# Patient Record
Sex: MALE | Race: ASIAN | NOT HISPANIC OR LATINO | URBAN - METROPOLITAN AREA
[De-identification: names, ages, dates, MRNs, and addresses within clinical notes are randomized per-mention and may not be internally consistent; named-entity substitution may affect disease eponyms.]

---

## 2023-09-30 ENCOUNTER — EMERGENCY (EMERGENCY)
Facility: HOSPITAL | Age: 38
LOS: 1 days | Discharge: LEFT BEFORE DISCHARGE | End: 2023-09-30
Attending: EMERGENCY MEDICINE
Payer: MEDICAID

## 2023-09-30 VITALS
TEMPERATURE: 99 F | OXYGEN SATURATION: 95 % | HEIGHT: 66 IN | DIASTOLIC BLOOD PRESSURE: 95 MMHG | HEART RATE: 112 BPM | SYSTOLIC BLOOD PRESSURE: 158 MMHG | RESPIRATION RATE: 16 BRPM

## 2023-09-30 PROCEDURE — L9991: CPT

## 2023-09-30 NOTE — ED ADULT NURSE NOTE - OBJECTIVE STATEMENT
Pt verbalize that " my heels are bothering me". Pt denies fall and denies trauma. Pt verbalize that the was drinking alochol today. Unable ot quantify amount.

## 2023-09-30 NOTE — ED ADULT TRIAGE NOTE - NS ED NURSE AMBULANCES
Morning  Hydrochlorothizide 25 mg  Amlodipine 10 mg  Aspirin 81 mg    Evening  Telmisartan 80 mg  Metoprolol 100 mg  Atorvastatin 80 mg       [facility]

## 2023-12-02 ENCOUNTER — EMERGENCY (EMERGENCY)
Facility: HOSPITAL | Age: 38
LOS: 1 days | Discharge: HOME | End: 2023-12-02
Attending: EMERGENCY MEDICINE | Admitting: EMERGENCY MEDICINE
Payer: MEDICAID

## 2023-12-02 VITALS
RESPIRATION RATE: 16 BRPM | OXYGEN SATURATION: 100 % | TEMPERATURE: 98 F | DIASTOLIC BLOOD PRESSURE: 71 MMHG | SYSTOLIC BLOOD PRESSURE: 109 MMHG | HEART RATE: 73 BPM

## 2023-12-02 LAB
ALBUMIN SERPL ELPH-MCNC: 4 G/DL — SIGNIFICANT CHANGE UP (ref 3.3–5)
ALBUMIN SERPL ELPH-MCNC: 4 G/DL — SIGNIFICANT CHANGE UP (ref 3.3–5)
ALP SERPL-CCNC: 82 U/L — SIGNIFICANT CHANGE UP (ref 40–120)
ALP SERPL-CCNC: 82 U/L — SIGNIFICANT CHANGE UP (ref 40–120)
ALT FLD-CCNC: 20 U/L — SIGNIFICANT CHANGE UP (ref 4–41)
ALT FLD-CCNC: 20 U/L — SIGNIFICANT CHANGE UP (ref 4–41)
ANION GAP SERPL CALC-SCNC: 14 MMOL/L — SIGNIFICANT CHANGE UP (ref 7–14)
ANION GAP SERPL CALC-SCNC: 14 MMOL/L — SIGNIFICANT CHANGE UP (ref 7–14)
AST SERPL-CCNC: 27 U/L — SIGNIFICANT CHANGE UP (ref 4–40)
AST SERPL-CCNC: 27 U/L — SIGNIFICANT CHANGE UP (ref 4–40)
BASOPHILS # BLD AUTO: 0.02 K/UL — SIGNIFICANT CHANGE UP (ref 0–0.2)
BASOPHILS # BLD AUTO: 0.02 K/UL — SIGNIFICANT CHANGE UP (ref 0–0.2)
BASOPHILS NFR BLD AUTO: 0.4 % — SIGNIFICANT CHANGE UP (ref 0–2)
BASOPHILS NFR BLD AUTO: 0.4 % — SIGNIFICANT CHANGE UP (ref 0–2)
BILIRUB DIRECT SERPL-MCNC: <0.2 MG/DL — SIGNIFICANT CHANGE UP (ref 0–0.3)
BILIRUB DIRECT SERPL-MCNC: <0.2 MG/DL — SIGNIFICANT CHANGE UP (ref 0–0.3)
BILIRUB INDIRECT FLD-MCNC: >0.1 MG/DL — SIGNIFICANT CHANGE UP (ref 0–1)
BILIRUB INDIRECT FLD-MCNC: >0.1 MG/DL — SIGNIFICANT CHANGE UP (ref 0–1)
BILIRUB SERPL-MCNC: 0.3 MG/DL — SIGNIFICANT CHANGE UP (ref 0.2–1.2)
BUN SERPL-MCNC: 13 MG/DL — SIGNIFICANT CHANGE UP (ref 7–23)
BUN SERPL-MCNC: 13 MG/DL — SIGNIFICANT CHANGE UP (ref 7–23)
CALCIUM SERPL-MCNC: 8.9 MG/DL — SIGNIFICANT CHANGE UP (ref 8.4–10.5)
CALCIUM SERPL-MCNC: 8.9 MG/DL — SIGNIFICANT CHANGE UP (ref 8.4–10.5)
CHLORIDE SERPL-SCNC: 108 MMOL/L — HIGH (ref 98–107)
CHLORIDE SERPL-SCNC: 108 MMOL/L — HIGH (ref 98–107)
CO2 SERPL-SCNC: 23 MMOL/L — SIGNIFICANT CHANGE UP (ref 22–31)
CO2 SERPL-SCNC: 23 MMOL/L — SIGNIFICANT CHANGE UP (ref 22–31)
CREAT SERPL-MCNC: 0.67 MG/DL — SIGNIFICANT CHANGE UP (ref 0.5–1.3)
CREAT SERPL-MCNC: 0.67 MG/DL — SIGNIFICANT CHANGE UP (ref 0.5–1.3)
EGFR: 122 ML/MIN/1.73M2 — SIGNIFICANT CHANGE UP
EGFR: 122 ML/MIN/1.73M2 — SIGNIFICANT CHANGE UP
EOSINOPHIL # BLD AUTO: 0.16 K/UL — SIGNIFICANT CHANGE UP (ref 0–0.5)
EOSINOPHIL # BLD AUTO: 0.16 K/UL — SIGNIFICANT CHANGE UP (ref 0–0.5)
EOSINOPHIL NFR BLD AUTO: 2.9 % — SIGNIFICANT CHANGE UP (ref 0–6)
EOSINOPHIL NFR BLD AUTO: 2.9 % — SIGNIFICANT CHANGE UP (ref 0–6)
GLUCOSE SERPL-MCNC: 79 MG/DL — SIGNIFICANT CHANGE UP (ref 70–99)
GLUCOSE SERPL-MCNC: 79 MG/DL — SIGNIFICANT CHANGE UP (ref 70–99)
HCT VFR BLD CALC: 36.4 % — LOW (ref 39–50)
HCT VFR BLD CALC: 36.4 % — LOW (ref 39–50)
HGB BLD-MCNC: 12 G/DL — LOW (ref 13–17)
HGB BLD-MCNC: 12 G/DL — LOW (ref 13–17)
IANC: 2.92 K/UL — SIGNIFICANT CHANGE UP (ref 1.8–7.4)
IANC: 2.92 K/UL — SIGNIFICANT CHANGE UP (ref 1.8–7.4)
IMM GRANULOCYTES NFR BLD AUTO: 0.2 % — SIGNIFICANT CHANGE UP (ref 0–0.9)
IMM GRANULOCYTES NFR BLD AUTO: 0.2 % — SIGNIFICANT CHANGE UP (ref 0–0.9)
LYMPHOCYTES # BLD AUTO: 2.03 K/UL — SIGNIFICANT CHANGE UP (ref 1–3.3)
LYMPHOCYTES # BLD AUTO: 2.03 K/UL — SIGNIFICANT CHANGE UP (ref 1–3.3)
LYMPHOCYTES # BLD AUTO: 37.3 % — SIGNIFICANT CHANGE UP (ref 13–44)
LYMPHOCYTES # BLD AUTO: 37.3 % — SIGNIFICANT CHANGE UP (ref 13–44)
MAGNESIUM SERPL-MCNC: 1.8 MG/DL — SIGNIFICANT CHANGE UP (ref 1.6–2.6)
MAGNESIUM SERPL-MCNC: 1.8 MG/DL — SIGNIFICANT CHANGE UP (ref 1.6–2.6)
MCHC RBC-ENTMCNC: 31 PG — SIGNIFICANT CHANGE UP (ref 27–34)
MCHC RBC-ENTMCNC: 31 PG — SIGNIFICANT CHANGE UP (ref 27–34)
MCHC RBC-ENTMCNC: 33 GM/DL — SIGNIFICANT CHANGE UP (ref 32–36)
MCHC RBC-ENTMCNC: 33 GM/DL — SIGNIFICANT CHANGE UP (ref 32–36)
MCV RBC AUTO: 94.1 FL — SIGNIFICANT CHANGE UP (ref 80–100)
MCV RBC AUTO: 94.1 FL — SIGNIFICANT CHANGE UP (ref 80–100)
MONOCYTES # BLD AUTO: 0.3 K/UL — SIGNIFICANT CHANGE UP (ref 0–0.9)
MONOCYTES # BLD AUTO: 0.3 K/UL — SIGNIFICANT CHANGE UP (ref 0–0.9)
MONOCYTES NFR BLD AUTO: 5.5 % — SIGNIFICANT CHANGE UP (ref 2–14)
MONOCYTES NFR BLD AUTO: 5.5 % — SIGNIFICANT CHANGE UP (ref 2–14)
NEUTROPHILS # BLD AUTO: 2.92 K/UL — SIGNIFICANT CHANGE UP (ref 1.8–7.4)
NEUTROPHILS # BLD AUTO: 2.92 K/UL — SIGNIFICANT CHANGE UP (ref 1.8–7.4)
NEUTROPHILS NFR BLD AUTO: 53.7 % — SIGNIFICANT CHANGE UP (ref 43–77)
NEUTROPHILS NFR BLD AUTO: 53.7 % — SIGNIFICANT CHANGE UP (ref 43–77)
NRBC # BLD: 0 /100 WBCS — SIGNIFICANT CHANGE UP (ref 0–0)
NRBC # BLD: 0 /100 WBCS — SIGNIFICANT CHANGE UP (ref 0–0)
NRBC # FLD: 0 K/UL — SIGNIFICANT CHANGE UP (ref 0–0)
NRBC # FLD: 0 K/UL — SIGNIFICANT CHANGE UP (ref 0–0)
PHOSPHATE SERPL-MCNC: 3.3 MG/DL — SIGNIFICANT CHANGE UP (ref 2.5–4.5)
PHOSPHATE SERPL-MCNC: 3.3 MG/DL — SIGNIFICANT CHANGE UP (ref 2.5–4.5)
PLATELET # BLD AUTO: 196 K/UL — SIGNIFICANT CHANGE UP (ref 150–400)
PLATELET # BLD AUTO: 196 K/UL — SIGNIFICANT CHANGE UP (ref 150–400)
POTASSIUM SERPL-MCNC: 4.1 MMOL/L — SIGNIFICANT CHANGE UP (ref 3.5–5.3)
POTASSIUM SERPL-MCNC: 4.1 MMOL/L — SIGNIFICANT CHANGE UP (ref 3.5–5.3)
POTASSIUM SERPL-SCNC: 4.1 MMOL/L — SIGNIFICANT CHANGE UP (ref 3.5–5.3)
POTASSIUM SERPL-SCNC: 4.1 MMOL/L — SIGNIFICANT CHANGE UP (ref 3.5–5.3)
PROT SERPL-MCNC: 7.1 G/DL — SIGNIFICANT CHANGE UP (ref 6–8.3)
PROT SERPL-MCNC: 7.1 G/DL — SIGNIFICANT CHANGE UP (ref 6–8.3)
RBC # BLD: 3.87 M/UL — LOW (ref 4.2–5.8)
RBC # BLD: 3.87 M/UL — LOW (ref 4.2–5.8)
RBC # FLD: 13.7 % — SIGNIFICANT CHANGE UP (ref 10.3–14.5)
RBC # FLD: 13.7 % — SIGNIFICANT CHANGE UP (ref 10.3–14.5)
SODIUM SERPL-SCNC: 145 MMOL/L — SIGNIFICANT CHANGE UP (ref 135–145)
SODIUM SERPL-SCNC: 145 MMOL/L — SIGNIFICANT CHANGE UP (ref 135–145)
WBC # BLD: 5.44 K/UL — SIGNIFICANT CHANGE UP (ref 3.8–10.5)
WBC # BLD: 5.44 K/UL — SIGNIFICANT CHANGE UP (ref 3.8–10.5)
WBC # FLD AUTO: 5.44 K/UL — SIGNIFICANT CHANGE UP (ref 3.8–10.5)
WBC # FLD AUTO: 5.44 K/UL — SIGNIFICANT CHANGE UP (ref 3.8–10.5)

## 2023-12-02 PROCEDURE — 99284 EMERGENCY DEPT VISIT MOD MDM: CPT

## 2023-12-02 NOTE — ED ADULT NURSE NOTE - NSFALLUNIVINTERV_ED_ALL_ED
Bed/Stretcher in lowest position, wheels locked, appropriate side rails in place/Call bell, personal items and telephone in reach/Instruct patient to call for assistance before getting out of bed/chair/stretcher/Non-slip footwear applied when patient is off stretcher/[place] to call system/Physically safe environment - no spills, clutter or unnecessary equipment/Purposeful proactive rounding/Room/bathroom lighting operational, light cord in reach Bed/Stretcher in lowest position, wheels locked, appropriate side rails in place/Call bell, personal items and telephone in reach/Instruct patient to call for assistance before getting out of bed/chair/stretcher/Non-slip footwear applied when patient is off stretcher/[place] to call system/Physically safe environment - no spills, clutter or unnecessary equipment/Purposeful proactive rounding/Room/bathroom lighting operational, light cord in reach Bed/Stretcher in lowest position, wheels locked, appropriate side rails in place/Call bell, personal items and telephone in reach/Instruct patient to call for assistance before getting out of bed/chair/stretcher/Non-slip footwear applied when patient is off stretcher/[place] to call system/Physically safe environment - no spills, clutter or unnecessary equipment/Purposeful proactive rounding/Room/bathroom lighting operational, light cord in reach

## 2023-12-02 NOTE — ED PROVIDER NOTE - PHYSICAL EXAMINATION
VITALS: reviewed  GEN: NAD, disheveled appearing, intoxicated appearing  HEAD/EYES: NCAT, PERRL, EOMI, anicteric sclerae, no conjunctival pallor  ENT: mucus membranes moist, oropharynx WNL, trachea midline, no JVD  RESP: lungs CTA with equal breath sounds bilaterally, chest wall nontender and atraumatic  CV: heart with reg rhythm S1, S2, no murmur; distal pulses intact and symmetric bilaterally  ABDOMEN: normoactive bowel sounds, soft, nondistended, nontender, no palpable masses  : no CVAT  MSK: extremities atraumatic and nontender, no edema, no asymmetry. the back is without midline or lateral tenderness, there is no spinal deformity or stepoff and the back is ranged painlessly. the neck has no midline tenderness, deformity, or stepoff, and is ranged painlessly.  SKIN: warm, dry, no rash, no bruising, no cyanosis. color appropriate for ethnicity  NEURO: alert, mentating appropriately, no facial asymmetry. gross sensation, motor, coordination are intact  PSYCH: intoxicated resting calmly at this time

## 2023-12-02 NOTE — ED ADULT NURSE NOTE - CHIEF COMPLAINT QUOTE
Pt Rocío speaking ID# [id number] pt c/o b/l knee pain x 4 months, endorses to daily drinking, reports he drinks 4-5 glasses of vodka per day with last drink about 45 minutes ago. Denies illicit drug use. blood glucose 83. Pt Rocío speaking ID# [id number] pt c/o b/l knee pain x 4 months, endorses to daily drinking, reports he drinks 4-5 glasses of vodka per day with last drink about 45 minutes ago. Denies illicit drug use. blood glucose 83. Pt Rocío speaking ID# [id number] pt c/o b/l knee pain x 4 months, endorses to daily drinking, reports he drinks 4-5 glasses of vodka per day with last drink about 45 minutes ago. Denies illicit drug use. blood glucose 83.

## 2023-12-02 NOTE — ED ADULT NURSE NOTE - NS ED NURSE RECORD ANOTHER VITAL SIGN
6/30/2023        Veronica Mosley  [address],   [place] [address]        Dear Veronica Mosley    Your test results from your last visit have returned.  Your LDL is better, but your triglyceride is elevated. Follow a low carb diet and talk to your primary care doctor about controlling your blood sugar. Please keep your previously scheduled follow up appointment to discuss in detail.  If you have any further questions, please feel free to call.     Resulted Orders   Lipid Panel With Reflex   Result Value Ref Range    Cholesterol 169 <=199 mg/dL    Triglycerides 357 (H) <=149 mg/dL    HDL 30 (L) >=50 mg/dL    LDL 68 <=129 mg/dL    Non-HDL Cholesterol 139 mg/dL    Cholesterol/ HDL Ratio 5.6 (H) <=4.4           Sincerely,        Lit Mcdonnell MD  [facility]-Cardiology Department  [address] [place] [id number]  Phone: [phone]    
Yes

## 2023-12-02 NOTE — ED PROVIDER NOTE - PROGRESS NOTE DETAILS
Nicolas Herrera, DO (PGY-1) Labs no severe electrolyte abnormalities. Patient clinically more sober, will DC. Not requesting any pain control for his leg pain as he currently is asymptomatic. Nicolas Herrera, DO (PGY-1) Patient reevaluated at bedside. Patient is doing well, clinically sober. Patient is undomiciled so will have social work give him resources. Nicolas Herrera, DO (PGY-1) Labs no severe electrolyte abnormalities. Will reassess in the morning to make sure patient clinically more sober, will DC. Not requesting any pain control for his leg pain as he currently is asymptomatic.

## 2023-12-02 NOTE — ED ADULT TRIAGE NOTE - CHIEF COMPLAINT QUOTE
Pt Rocío speaking ID# [id number] pt c/o b/l knee pain x 4 months, endorses to daily drinking, reports he drinks 4-5 glasses of vodka per day with last drink about 45 minutes ago. Denies illicit drug use. blood glucose 83.

## 2023-12-02 NOTE — ED PROVIDER NOTE - CLINICAL SUMMARY MEDICAL DECISION MAKING FREE TEXT BOX
39 y/o M Rocío speaking PMH ETOH abuse presents c/o b/l leg pain which he states feels like a burning sensation from the ankles to the knees. Denies any recent trauma  VSS  On exam patient is mildly intoxicated but oriented to person place and time, easily arousable. Extremities no edema non-tender some excoriations no rashes or deformities. Some areas of ecchymosis in late stages of healing.   DDx includes but not limited to neuropathy iso of chronic alcohol use. Less likely dvt iso no calf tendrness. 37 y/o M Rocío speaking PMH ETOH abuse presents c/o b/l leg pain which he states feels like a burning sensation from the ankles to the knees. Denies any recent trauma  VSS  On exam patient is mildly intoxicated but oriented to person place and time, easily arousable. Extremities no edema non-tender some excoriations no rashes or deformities. Some areas of ecchymosis in late stages of healing.   DDx includes but not limited to neuropathy iso of chronic alcohol use. Less likely dvt iso no calf tendrness. 39 y/o M Rocío speaking PMH ETOH abuse presents c/o b/l leg pain which he states feels like a burning sensation from the ankles to the knees. Denies any recent trauma  VSS  On exam patient is mildly intoxicated but oriented to person place and time, easily arousable. Extremities no edema non-tender some excoriations no rashes or deformities. Some areas of ecchymosis in late stages of healing.   DDx includes but not limited to neuropathy iso of chronic alcohol use. Less likely dvt iso no calf tendrness. [facility], labs, DC 37 y/o M Rocío speaking PMH ETOH abuse presents c/o b/l leg pain which he states feels like a burning sensation from the ankles to the knees. Denies any recent trauma  VSS  On exam patient is mildly intoxicated but oriented to person place and time, easily arousable. Extremities no edema non-tender some excoriations no rashes or deformities. Some areas of ecchymosis in late stages of healing.   DDx includes but not limited to neuropathy iso of chronic alcohol use. Less likely dvt iso no calf tendrness. [facility], labs, DC 39 y/o M Rocío speaking PMH ETOH abuse presents c/o b/l leg pain which he states feels like a burning sensation from the ankles to the knees. Denies any recent trauma  VSS  On exam patient is mildly intoxicated but oriented to person place and time, easily arousable. Extremities no edema non-tender some excoriations no rashes or deformities. Some areas of ecchymosis in late stages of healing.   DDx includes but not limited to neuropathy iso of chronic alcohol use. Less likely dvt iso no calf tendrness. [facility], labs, DC

## 2023-12-02 NOTE — ED PROVIDER NOTE - OBJECTIVE STATEMENT
Patient is a 39 y/o M Rocío speaking (ID# [id number] Dony)  PMHx ETOH abuse ( states he drinks 4-5 glasses of vodka per day with last drink about 45 minutes prior to arrival, states he has never been admitted for alcohol withdrawal)  presents for bilateral leg pain,  from his ankles to his knees for the past 4 months. Patient states he drinks alcohol when it is cold out and he is homeless so he can't go inside. His leg pain feels like a burning sensation. Denies any trauma to his legs. No fevers or rashes. Denies any anxiety, palpitations, states he does not feel like he is in withdrawal.   Patient denies any fevers, chills, nausea, vomiting, chest pain, diarrhea, constipation, painful urination, new rashes.   Denies illicit drug use. Patient is a 37 y/o M Rocío speaking (ID# [id number] Dony)  PMHx ETOH abuse ( states he drinks 4-5 glasses of vodka per day with last drink about 45 minutes prior to arrival, states he has never been admitted for alcohol withdrawal)  presents for bilateral leg pain,  from his ankles to his knees for the past 4 months. Patient states he drinks alcohol when it is cold out and he is homeless so he can't go inside. His leg pain feels like a burning sensation. Denies any trauma to his legs. No fevers or rashes. Denies any anxiety, palpitations, states he does not feel like he is in withdrawal.   Patient denies any fevers, chills, nausea, vomiting, chest pain, diarrhea, constipation, painful urination, new rashes.   Denies illicit drug use. Patient is a 39 y/o M Rocío speaking (ID# [id number] Dony)  PMHx ETOH abuse ( states he drinks 4-5 glasses of vodka per day with last drink about 45 minutes prior to arrival, states he has never been admitted for alcohol withdrawal)  presents for bilateral leg pain,  from his ankles to his knees for the past 4 months. Patient states he drinks alcohol when it is cold out and he is homeless so he can't go inside. His leg pain feels like a burning sensation. Denies any trauma to his legs. No fevers or rashes. Denies any anxiety, palpitations, states he does not feel like he is in withdrawal.   Patient denies any fevers, chills, nausea, vomiting, chest pain, diarrhea, constipation, painful urination, new rashes.   Denies illicit drug use.

## 2023-12-02 NOTE — ED PROVIDER NOTE - ATTENDING CONTRIBUTION TO CARE
Attending Statement: I have personally seen and examined this patient. I have fully participated in the care of this patient. I have reviewed all pertinent clinical information, including history physical exam, plan and the Resident's note and agree except as noted  "37 y/o M Rocío speaking (ID# [id number] Dony)  PMHx ETOH abuse ( states he drinks 4-5 glasses of vodka per day with last drink about 45 minutes prior to arrival, states he has never been admitted for alcohol withdrawal)"  agree w above:  Chief complaint of bilateral leg pain for months.  Denies any falls or trauma.  Able to walk.  No numbness no weakness.  Endorses daily alcohol use drank prior to arrival.  Has no other complaints.  ROS unremarkable.  vss  nontoxic male laying in  bed, no distress.  cooperative not tremulous. no WOB. soft nt abdomen. moving all ext no difficulty. no shortening or rotation of bl lower ext. no bl leg swelling. no lac. old healed scabs.   plan labs, ciwa monitor and reassess Attending Statement: I have personally seen and examined this patient. I have fully participated in the care of this patient. I have reviewed all pertinent clinical information, including history physical exam, plan and the Resident's note and agree except as noted  "39 y/o M Rocío speaking (ID# [id number] Dony)  PMHx ETOH abuse ( states he drinks 4-5 glasses of vodka per day with last drink about 45 minutes prior to arrival, states he has never been admitted for alcohol withdrawal)"  agree w above:  Chief complaint of bilateral leg pain for months.  Denies any falls or trauma.  Able to walk.  No numbness no weakness.  Endorses daily alcohol use drank prior to arrival.  Has no other complaints.  ROS unremarkable.  vss  nontoxic male laying in  bed, no distress.  cooperative not tremulous. no WOB. soft nt abdomen. moving all ext no difficulty. no shortening or rotation of bl lower ext. no bl leg swelling. no lac. old healed scabs.   plan labs, ciwa monitor and reassess Attending Statement: I have personally seen and examined this patient. I have fully participated in the care of this patient. I have reviewed all pertinent clinical information, including history physical exam, plan and the Resident's note and agree except as noted  "37 y/o M Rocío speaking (ID# [id number] Dony)  PMHx ETOH abuse ( states he drinks 4-5 glasses of vodka per day with last drink about 45 minutes prior to arrival, states he has never been admitted for alcohol withdrawal)"  agree w above:  Chief complaint of bilateral leg pain for months.  Denies any falls or trauma.  Able to walk.  No numbness no weakness.  Endorses daily alcohol use drank prior to arrival.  Has no other complaints.  ROS unremarkable.  vss  nontoxic male laying in  bed, no distress.  cooperative not tremulous. no WOB. soft nt abdomen. moving all ext no difficulty. no shortening or rotation of bl lower ext. no bl leg swelling. no lac. old healed scabs.   plan labs, ciwa monitor and reassess

## 2023-12-02 NOTE — ED ADULT NURSE NOTE - OBJECTIVE STATEMENT
patient presents to ed c/o b/l leg pain. denies falls or trauma. endorses ETOH. reports drinking "4 small of vodka". denies any cp, sob, n/v, fever/chills

## 2023-12-03 VITALS
HEART RATE: 70 BPM | DIASTOLIC BLOOD PRESSURE: 68 MMHG | RESPIRATION RATE: 16 BRPM | TEMPERATURE: 98 F | SYSTOLIC BLOOD PRESSURE: 119 MMHG | OXYGEN SATURATION: 98 %

## 2023-12-03 LAB
ANION GAP SERPL CALC-SCNC: 18 MMOL/L — HIGH (ref 7–14)
ANION GAP SERPL CALC-SCNC: 18 MMOL/L — HIGH (ref 7–14)
BUN SERPL-MCNC: 13 MG/DL — SIGNIFICANT CHANGE UP (ref 7–23)
BUN SERPL-MCNC: 13 MG/DL — SIGNIFICANT CHANGE UP (ref 7–23)
CALCIUM SERPL-MCNC: 8.7 MG/DL — SIGNIFICANT CHANGE UP (ref 8.4–10.5)
CALCIUM SERPL-MCNC: 8.7 MG/DL — SIGNIFICANT CHANGE UP (ref 8.4–10.5)
CHLORIDE SERPL-SCNC: 109 MMOL/L — HIGH (ref 98–107)
CHLORIDE SERPL-SCNC: 109 MMOL/L — HIGH (ref 98–107)
CO2 SERPL-SCNC: 18 MMOL/L — LOW (ref 22–31)
CO2 SERPL-SCNC: 18 MMOL/L — LOW (ref 22–31)
CREAT SERPL-MCNC: 0.64 MG/DL — SIGNIFICANT CHANGE UP (ref 0.5–1.3)
CREAT SERPL-MCNC: 0.64 MG/DL — SIGNIFICANT CHANGE UP (ref 0.5–1.3)
EGFR: 124 ML/MIN/1.73M2 — SIGNIFICANT CHANGE UP
EGFR: 124 ML/MIN/1.73M2 — SIGNIFICANT CHANGE UP
GLUCOSE SERPL-MCNC: 119 MG/DL — HIGH (ref 70–99)
GLUCOSE SERPL-MCNC: 119 MG/DL — HIGH (ref 70–99)
MAGNESIUM SERPL-MCNC: 1.7 MG/DL — SIGNIFICANT CHANGE UP (ref 1.6–2.6)
MAGNESIUM SERPL-MCNC: 1.7 MG/DL — SIGNIFICANT CHANGE UP (ref 1.6–2.6)
PHOSPHATE SERPL-MCNC: 2.5 MG/DL — SIGNIFICANT CHANGE UP (ref 2.5–4.5)
PHOSPHATE SERPL-MCNC: 2.5 MG/DL — SIGNIFICANT CHANGE UP (ref 2.5–4.5)
POTASSIUM SERPL-MCNC: 4.1 MMOL/L — SIGNIFICANT CHANGE UP (ref 3.5–5.3)
POTASSIUM SERPL-MCNC: 4.1 MMOL/L — SIGNIFICANT CHANGE UP (ref 3.5–5.3)
POTASSIUM SERPL-SCNC: 4.1 MMOL/L — SIGNIFICANT CHANGE UP (ref 3.5–5.3)
POTASSIUM SERPL-SCNC: 4.1 MMOL/L — SIGNIFICANT CHANGE UP (ref 3.5–5.3)
SODIUM SERPL-SCNC: 145 MMOL/L — SIGNIFICANT CHANGE UP (ref 135–145)
SODIUM SERPL-SCNC: 145 MMOL/L — SIGNIFICANT CHANGE UP (ref 135–145)

## 2023-12-03 NOTE — PROVIDER CONTACT NOTE (OTHER) - BACKGROUND
[employer] ipad. Writer discussed all available shelter resources and drop in center information with pt. Writer also provided pt with substance abuse resources.

## 2023-12-03 NOTE — PROVIDER CONTACT NOTE (OTHER) - SITUATION
MD requested SW consult for shelter resources. Pt also presented to the ED with alcohol intox. Pt is Rocío speaking. Writer met with pt at bedside with use of  ID #[id number] (Tomasz) from MD requested SW consult for shelter resources. Pt also presented to the ED with alcohol intox. Pt is Orc[place] speaking. Writer met with pt at bedside with use of  ID #[id number] (Tomasz) from MD requested SW consult for shelter resources. Pt also presented to the ED with alcohol intox. Pt is Rocío speaking. Writer met with pt at bedside with use of  ID #[id number] (Tomasz) from

## 2023-12-03 NOTE — PROVIDER CONTACT NOTE (OTHER) - ASSESSMENT
Two [employer] cards ([id number]) given to pt. Pt was accepting of all information. Two [employer] cards ([id number]) given to pt. Pt was accepting of all information. Two [employer] cards ([id number]/199) given to pt. Pt was accepting of all information.

## 2023-12-03 NOTE — ED ADULT NURSE REASSESSMENT NOTE - NS ED NURSE REASSESS COMMENT FT1
received report from PATTIE peterson. Patient received in spot 26a. pt remains at baseline mental status, RR even unlabored completing full sentences. pt resting in stretcher comfortably at this time, no new complaints offered. stretcher lowest position siderails up safety measures in place. Comfort and safety maintainted. Pending dispo received report from PATTEI peterson. Patient received in spot 26a. pt remains at baseline mental status, RR even unlabored completing full sentences. pt resting in stretcher comfortably at this time, no new complaints offered. stretcher lowest position siderails up safety measures in place. Comfort and safety maintainted. Pending dispo
